# Patient Record
Sex: FEMALE | Race: ASIAN | URBAN - METROPOLITAN AREA
[De-identification: names, ages, dates, MRNs, and addresses within clinical notes are randomized per-mention and may not be internally consistent; named-entity substitution may affect disease eponyms.]

---

## 2020-07-14 ENCOUNTER — MEDICAL CORRESPONDENCE (OUTPATIENT)
Dept: HEALTH INFORMATION MANAGEMENT | Facility: CLINIC | Age: 35
End: 2020-07-14

## 2020-07-14 ENCOUNTER — TRANSFERRED RECORDS (OUTPATIENT)
Dept: HEALTH INFORMATION MANAGEMENT | Facility: CLINIC | Age: 35
End: 2020-07-14

## 2020-08-13 ENCOUNTER — TRANSFERRED RECORDS (OUTPATIENT)
Dept: HEALTH INFORMATION MANAGEMENT | Facility: CLINIC | Age: 35
End: 2020-08-13

## 2020-08-27 ENCOUNTER — ALLIED HEALTH/NURSE VISIT (OUTPATIENT)
Dept: OPHTHALMOLOGY | Facility: CLINIC | Age: 35
End: 2020-08-27
Attending: OPHTHALMOLOGY
Payer: COMMERCIAL

## 2020-08-27 DIAGNOSIS — Z79.899 LONG-TERM USE OF PLAQUENIL: Primary | ICD-10-CM

## 2020-08-27 PROCEDURE — 92274 MULTIFOCAL ERG W/I&R: CPT | Mod: ZF

## 2020-08-27 PROCEDURE — 40000269 ZZH STATISTIC NO CHARGE FACILITY FEE: Mod: ZF

## 2020-08-27 ASSESSMENT — VISUAL ACUITY
OS_CC: 20/20
METHOD: SNELLEN - LINEAR
CORRECTION_TYPE: GLASSES
OD_CC: 20/25

## 2020-08-27 ASSESSMENT — REFRACTION_WEARINGRX
OD_SPHERE: -1.50
OS_SPHERE: -0.75

## 2020-08-27 NOTE — NURSING NOTE
"Chief Complaints and History of Present Illnesses   Patient presents with     Procedure     Chief Complaint(s) and History of Present Illness(es)     Procedure               Comments     mfERG for plaquenil monitoring  Referred by Rea Hu/[facility]  Started taking plaquenil 4/2018 400mg/day for Lupus  Height 5'1\", weight 180lbs  Anel Berg COA 3:09 PM August 27, 2020                   "

## 2020-08-27 NOTE — PROGRESS NOTES
Assessment & Plan     Amara Bledsoe is a 34 year old female with the following diagnoses:   1. Long-term use of Plaquenil       This is a well-performed and reliable multifocal electroretinogram both eyes.  The amplitudes and latencies are normal throughout all 103 hexagons both eyes.  There is no evidence of plaquenil toxicity.         Attending Physician Attestation:  Complete documentation of historical and exam elements from today's encounter can be found in the full encounter summary report (not reduplicated in this progress note).  I personally obtained the chief complaint(s) and history of present illness.  I confirmed and edited as necessary the review of systems, past medical/surgical history, family history, social history, and examination findings as documented by others; and I examined the patient myself.  I personally reviewed the relevant tests, images, and reports as documented above.  I formulated and edited as necessary the assessment and plan and discussed the findings and management plan with the patient and family. - Sunil Solano MD

## 2020-08-27 NOTE — LETTER
2020         RE:  :  MRN: Amara Bledsoe  1985  [id number]     Dear Dr. Hu,    Your patient, Amara Bledsoe, came for a multifocal electroretinogram only.     Assessment & Plan     Amara Bledsoe is a 34 year old female with the following diagnoses:   1. Long-term use of Plaquenil       This is a well-performed and reliable multifocal electroretinogram both eyes.  The amplitudes and latencies are normal throughout all 103 hexagons both eyes.  There is no evidence of plaquenil toxicity.      Again, thank you for allowing me to participate in the care of your patient.      Sincerely,    Sunil Solano MD  Professor  Ophthalmology Residency   Director of Neuro-Ophthalmology  [place] [facility] Chair  Departments of Ophthalmology, Neurology, and Neurosurgery  [address]  [address]  T - [phone]  F - [phone]  KILLIAN [email or web address]      CC: Rea Hu MD  [facility]  [address] [facility] [address] [facility] [address]  VIA Facsimile: [phone]     Jennifer Wahl MD  [address] [address]  VIA In Basket

## 2020-11-16 ENCOUNTER — HEALTH MAINTENANCE LETTER (OUTPATIENT)
Age: 35
End: 2020-11-16

## 2021-09-18 ENCOUNTER — HEALTH MAINTENANCE LETTER (OUTPATIENT)
Age: 36
End: 2021-09-18

## 2022-01-08 ENCOUNTER — HEALTH MAINTENANCE LETTER (OUTPATIENT)
Age: 37
End: 2022-01-08

## 2022-11-20 ENCOUNTER — HEALTH MAINTENANCE LETTER (OUTPATIENT)
Age: 37
End: 2022-11-20

## 2023-04-15 ENCOUNTER — HEALTH MAINTENANCE LETTER (OUTPATIENT)
Age: 38
End: 2023-04-15